# Patient Record
(demographics unavailable — no encounter records)

---

## 2024-12-11 NOTE — HISTORY OF PRESENT ILLNESS
[de-identified] : COUGH, VOMITING AND RUNNY NOSE [FreeTextEntry6] : incessant productive cough difficulty staying at sleep at night vomits after coughing at times mild runny nose sib w/same sxs

## 2024-12-11 NOTE — PHYSICAL EXAM
[Clear Rhinorrhea] : clear rhinorrhea [NL] : warm, clear [FreeTextEntry7] : diffuse rales throughout

## 2024-12-11 NOTE — HISTORY OF PRESENT ILLNESS
[de-identified] : COUGH, VOMITING AND RUNNY NOSE [FreeTextEntry6] : incessant productive cough difficulty staying at sleep at night vomits after coughing at times mild runny nose sib w/same sxs

## 2024-12-16 NOTE — PHYSICAL EXAM

## 2024-12-16 NOTE — DISCUSSION/SUMMARY
[Family Support] : family support [Encouraging Literacy Activities] : encouraging literacy activities [Playing with Peers] : playing with peers [Promoting Physical Activity] : promoting physical activity [Safety] : safety [Mother] : mother [] : The components of the vaccine(s) to be administered today are listed in the plan of care. The disease(s) for which the vaccine(s) are intended to prevent and the risks have been discussed with the caretaker.  The risks are also included in the appropriate vaccination information statements which have been provided to the patient's caregiver.  The caregiver has given consent to vaccinate. [FreeTextEntry1] :  3 yr old M presenting for a WCC. PE wnl. Hgb wnl and lead negative. Appropriate growth and development. Amblyopia screen no risk factors.   Continue balanced diet with all food groups. Brush teeth twice a day with toothbrush. Recommend visit to dentist. As per car seat 's guidelines, use forward-facing car seat in back seat of car. Switch to booster seat when child reaches highest weight/height for seat. Child needs to ride in a belt-positioning booster seat until  4 feet 9 inches has been reached and are between 8 and 12 years of age. Put toddler to sleep in own bed. Help toddler to maintain consistent daily routines and sleep schedule. Pre-K discussed. Ensure home is safe. Use consistent, positive discipline. Read aloud to toddler. Limit screen time to no more than 2 hours per day. Return for well child check in 1 year. Hep A and Flu today

## 2024-12-16 NOTE — HISTORY OF PRESENT ILLNESS
[Mother] : mother [Fruit] : fruit [Vegetables] : vegetables [Meat] : meat [___ stools per day] : [unfilled]  stools per day [Normal] : Normal [Brushing teeth] : Brushing teeth [Yes] : Patient goes to dentist yearly [Toothpaste] : Primary Fluoride Source: Toothpaste [Playtime (60 min/d)] : Playtime 60 min a day [< 2 hrs of screen time] : Less than 2 hrs of screen time [Appropiate parent-child communication] : Appropriate parent-child communication [Parent has appropriate responses to behavior] : Parent has appropriate responses to behavior [No] : No cigarette smoke exposure [Car seat in back seat] : Car seat in back seat [Smoke Detectors] : Smoke detectors [Supervised play near cars and streets] : Supervised play near cars and streets [Carbon Monoxide Detectors] : Carbon monoxide detectors [Up to date] : Up to date [Exposure to electronic nicotine delivery system] : No exposure to electronic nicotine delivery system [de-identified] : Has been more picky. Drinking water. Allergic to milk and nuts.  [FreeTextEntry8] : Working on [employer] training [de-identified] : No concerns.  [FreeTextEntry9] : ; does well with the other kids, some sharing issues. [NO] : No

## 2024-12-16 NOTE — DEVELOPMENTAL MILESTONES
[Normal Development] : Normal Development [None] : none [Plays and shares with others] : plays and shares with others [Put on coat, jacket, or shirt by self] : puts on coat, jacket, or shirt by self [Begins to play make-believe] : begins to play make-believe [Eats independently] : eats independently [Uses 3-word sentences] : uses 3-word sentences [Uses words that are 75% intelligible] : uses words that are 75% intelligible to strangers [Understands simple prepositions] : understands simple prepositions [Tells a story from a book or TV] : tells a story from a book or TV [Compares things using words such] : compares things using words such as bigger or shorter [Pedals tricycle] : pedals tricycle [Climbs on and off couch] : climbs on and off couch or chair [Jumps forward] : jumps forward [Draws a single [place]] : draws a single [place] [Draws a person with head] : draws a person with head and one other body part [Cuts with child scissor] : cuts with child scissor [Goes to the bathroom and urinates] : does not go to bathroom and urinates by self

## 2025-02-03 NOTE — PHYSICAL EXAM
[EOMI] : grossly EOMI [Conjuctival Injection] : conjunctival injection [Increased Tearing] : no increased tearing [Discharge] : no discharge [Eyelid Swelling] : no eyelid swelling [Clear Rhinorrhea] : clear rhinorrhea [Inflamed Nasal Mucosa] : inflamed nasal mucosa [Erythematous Oropharynx] : erythematous oropharynx [NL] : no abnormal lymph nodes palpated

## 2025-02-03 NOTE — REVIEW OF SYSTEMS
[Fever] : fever [Nasal Discharge] : nasal discharge [Nasal Congestion] : nasal congestion [Tachypnea] : not tachypneic [Wheezing] : no wheezing [Cough] : cough [Congestion] : congestion [Negative] : Genitourinary

## 2025-02-03 NOTE — HISTORY OF PRESENT ILLNESS
[de-identified] : COUGH, SORE THROAT, FEVER, RUNNY NOSE AND SWOLLEN RIGHT EYE X3D; HAS DECREASED APPETITE, BUT DRINKING FLUIDS WELL